# Patient Record
Sex: MALE | Race: WHITE | NOT HISPANIC OR LATINO | Employment: OTHER | URBAN - NONMETROPOLITAN AREA
[De-identification: names, ages, dates, MRNs, and addresses within clinical notes are randomized per-mention and may not be internally consistent; named-entity substitution may affect disease eponyms.]

---

## 2022-12-08 ENCOUNTER — OFFICE VISIT (OUTPATIENT)
Dept: CARDIOLOGY | Facility: CLINIC | Age: 72
End: 2022-12-08

## 2022-12-08 VITALS
SYSTOLIC BLOOD PRESSURE: 110 MMHG | BODY MASS INDEX: 25.18 KG/M2 | HEIGHT: 67 IN | WEIGHT: 160.4 LBS | DIASTOLIC BLOOD PRESSURE: 64 MMHG | HEART RATE: 60 BPM

## 2022-12-08 DIAGNOSIS — Z95.1 HISTORY OF CORONARY ARTERY BYPASS GRAFT X 3: ICD-10-CM

## 2022-12-08 DIAGNOSIS — R53.83 OTHER FATIGUE: ICD-10-CM

## 2022-12-08 DIAGNOSIS — J43.9 PULMONARY EMPHYSEMA, UNSPECIFIED EMPHYSEMA TYPE: ICD-10-CM

## 2022-12-08 DIAGNOSIS — R94.31 ABNORMAL EKG: ICD-10-CM

## 2022-12-08 DIAGNOSIS — D50.9 IRON DEFICIENCY ANEMIA, UNSPECIFIED IRON DEFICIENCY ANEMIA TYPE: ICD-10-CM

## 2022-12-08 DIAGNOSIS — R06.02 SHORTNESS OF BREATH: ICD-10-CM

## 2022-12-08 DIAGNOSIS — I65.23 BILATERAL CAROTID ARTERY STENOSIS: ICD-10-CM

## 2022-12-08 DIAGNOSIS — I44.0 FIRST DEGREE AV BLOCK: ICD-10-CM

## 2022-12-08 DIAGNOSIS — E78.00 PURE HYPERCHOLESTEROLEMIA: ICD-10-CM

## 2022-12-08 DIAGNOSIS — R42 DIZZINESS: ICD-10-CM

## 2022-12-08 DIAGNOSIS — I10 PRIMARY HYPERTENSION: ICD-10-CM

## 2022-12-08 DIAGNOSIS — I45.10 RBBB: ICD-10-CM

## 2022-12-08 DIAGNOSIS — K21.9 GASTROESOPHAGEAL REFLUX DISEASE, UNSPECIFIED WHETHER ESOPHAGITIS PRESENT: ICD-10-CM

## 2022-12-08 DIAGNOSIS — I25.118 CORONARY ARTERY DISEASE INVOLVING NATIVE CORONARY ARTERY OF NATIVE HEART WITH OTHER FORM OF ANGINA PECTORIS: Primary | ICD-10-CM

## 2022-12-08 PROCEDURE — 93000 ELECTROCARDIOGRAM COMPLETE: CPT | Performed by: NURSE PRACTITIONER

## 2022-12-08 PROCEDURE — 99204 OFFICE O/P NEW MOD 45 MIN: CPT | Performed by: NURSE PRACTITIONER

## 2022-12-08 RX ORDER — ROSUVASTATIN CALCIUM 40 MG/1
40 TABLET, COATED ORAL DAILY
COMMUNITY

## 2022-12-08 RX ORDER — PANTOPRAZOLE SODIUM 40 MG/1
40 TABLET, DELAYED RELEASE ORAL DAILY
COMMUNITY

## 2022-12-08 RX ORDER — AMLODIPINE BESYLATE 5 MG/1
5 TABLET ORAL DAILY
COMMUNITY

## 2022-12-08 RX ORDER — ASPIRIN 81 MG/1
81 TABLET ORAL DAILY
COMMUNITY

## 2022-12-08 RX ORDER — FLUTICASONE PROPIONATE 50 MCG
2 SPRAY, SUSPENSION (ML) NASAL 2 TIMES DAILY
COMMUNITY

## 2022-12-08 RX ORDER — ALBUTEROL SULFATE 90 UG/1
2 AEROSOL, METERED RESPIRATORY (INHALATION) EVERY 4 HOURS PRN
COMMUNITY

## 2022-12-08 RX ORDER — HYDROCHLOROTHIAZIDE 12.5 MG/1
12.5 TABLET ORAL DAILY
COMMUNITY

## 2022-12-08 RX ORDER — FERROUS SULFATE 325(65) MG
325 TABLET ORAL
COMMUNITY

## 2022-12-08 RX ORDER — LISINOPRIL 40 MG/1
40 TABLET ORAL DAILY
COMMUNITY

## 2022-12-08 RX ORDER — ATENOLOL 25 MG/1
25 TABLET ORAL DAILY
COMMUNITY

## 2022-12-08 NOTE — PROGRESS NOTES
Subjective   John Urbina is a 72 y.o. male seen in the office today to establish cardiac care.  His past medical history includes: Hypertension, hyperlipidemia, CAD, CKD, and iron deficiency anemia.  He underwent three-vessel coronary artery bypass grafting per Dr. Fox in 2007.  Patient admits being diagnosed with mild stenosis and carotid arteries but unsure when last checked.  He has a history of smoking during  with cessation in 1990.  He admits to daily alcohol intake but no more than 2 beers.  Smoking hx with cessation in 1990.     In September, he was seen in the hospital due to dizziness and hypertension.  CT head scan was negative for active or acute intracranial processes.  CT of the chest without contrast showed emphysema.  Cardiac size was normal with mention of advanced atherosclerotic plaque formation throughout the coronary arteries.  He was diagnosed and treated for iron deficiency anemia.    Currently he admits to shortness of breath and fatigue but denies chest pain or palpitations.  TIA symptoms denied.  He admits to maintaining his normal daily activities which some days includes golfing.  No recent change in cardiac medications noted per patient.    Chief Complaint   Patient presents with   • Establish Care     [facility]. Patient has CABG in 2007 . He received  blood in July 2022 d/t anemia . Patient has no cardiac complaints today .Has not saw Cardiology since 2010 .   • LABS and TESTING     Had labs in July 2022 at [facility] . [facility] and will most likely have labs then.          Initial cardiac evaluation; to establish cardiac care    Cardiac History  No past surgical history on file.    Current Outpatient Medications   Medication Sig Dispense Refill   • albuterol sulfate  (90 Base) MCG/ACT inhaler Inhale 2 puffs Every 4 (Four) Hours As Needed for Wheezing.     • amLODIPine (NORVASC) 5 MG tablet Take 5 mg by mouth Daily.      • aspirin 81 MG EC tablet Take 81 mg by mouth Daily.     • atenolol (TENORMIN) 25 MG tablet Take 25 mg by mouth Daily.     • CHLORPHENIRAMINE MALEATE ER PO Take 1 tablet by mouth Daily With Breakfast & Dinner.     • ferrous sulfate 325 (65 FE) MG tablet Take 325 mg by mouth Daily With Breakfast.     • fluticasone (FLONASE) 50 MCG/ACT nasal spray 2 sprays into the nostril(s) as directed by provider 2 (Two) Times a Day.     • hydroCHLOROthiazide (HYDRODIURIL) 12.5 MG tablet Take 12.5 mg by mouth Daily.     • lisinopril (PRINIVIL,ZESTRIL) 40 MG tablet Take 40 mg by mouth Daily.     • pantoprazole (PROTONIX) 40 MG EC tablet Take 40 mg by mouth Daily.     • rosuvastatin (CRESTOR) 40 MG tablet Take 40 mg by mouth Daily.       No current facility-administered medications for this visit.       Patient has no known allergies.    Past Medical History:   Diagnosis Date   • COPD (chronic obstructive pulmonary disease) ([facility]ly [facility])    • Hyperlipidemia    • Kidney stones    • Myocardial infarction (Formerly [facility])        Social History     Socioeconomic History   • Marital status:    Tobacco Use   • Smoking status: Former     Packs/day: 1.00     Years: 30.00     Pack years: 30.00     Types: Cigarettes     Quit date:      Years since quittin.9   • Smokeless tobacco: Former     Types: Chew   Vaping Use   • Vaping Use: Never used   Substance and Sexual Activity   • Alcohol use: Yes     Alcohol/week: 2.0 standard drinks     Types: 2 Cans of beer per week     Comment: weekly   • Drug use: Never       Family History   Problem Relation Age of Onset   • Lung cancer Mother    • Emphysema Mother    • Emphysema Father    • Diabetes Sister    • No Known Problems Brother        Review of Systems   Constitutional: Positive for fatigue. Negative for activity change, appetite change and fever.   HENT: Positive for ear pain (At times), hearing loss and tinnitus. Negative for congestion and sinus pressure.    Eyes: Negative for pain and redness.  "  Respiratory: Positive for shortness of breath. Negative for apnea, cough and chest tightness.    Cardiovascular: Negative for chest pain, palpitations and leg swelling.   Gastrointestinal: Negative for abdominal pain, anal bleeding, blood in stool, constipation, diarrhea and nausea.   Endocrine: Negative.    Genitourinary: Negative for difficulty urinating and hematuria.   Musculoskeletal: Positive for arthralgias. Negative for gait problem.   Skin: Negative.    Neurological: Positive for dizziness (Especially \"when blood was low\"). Negative for seizures, syncope, speech difficulty, weakness, numbness and headaches.   Hematological: Negative for adenopathy. Bruises/bleeds easily.   Psychiatric/Behavioral: Negative for sleep disturbance.        Patient admits to feeling more depression since the loss of his daughter earlier this year.  Since that time he admits to marital issues.       BP Readings from Last 5 Encounters:   12/08/22 110/64       Wt Readings from Last 5 Encounters:   12/08/22 72.8 kg (160 lb 6.4 oz)          Objective      Labs 9/14/2022: WBC 5.6, RBC 3.35, hemoglobin 8.2, hematocrit 26.7, platelets 213, PT 10.5, INR 0.95, PTT 24.2, stool occult blood negative, troponin 13, sodium 139, potassium 4.4, chloride 107, CO2 24, glucose 89, BUN 27, creatinine 2.1, GFR 33, calcium 9.6,    /64 (BP Location: Left arm, Patient Position: Sitting)   Pulse 60   Ht 170.2 cm (67\")   Wt 72.8 kg (160 lb 6.4 oz)   BMI 25.12 kg/m²     Physical Exam  Vitals and nursing note reviewed.   Constitutional:       Appearance: Normal appearance.   HENT:      Head: Normocephalic.      Nose: Nose normal.   Eyes:      Conjunctiva/sclera: Conjunctivae normal.   Neck:      Vascular: No carotid bruit or JVD.   Cardiovascular:      Rate and Rhythm: Normal rate and regular rhythm.      Pulses:           Radial pulses are 2+ on the right side and 2+ on the left side.   Pulmonary:      Effort: Pulmonary effort is normal.      " Breath sounds: Decreased air movement present. No wheezing or rales.   Abdominal:      General: Abdomen is flat. Bowel sounds are normal. There is no distension.      Tenderness: There is no abdominal tenderness.   Musculoskeletal:      Cervical back: Neck supple.      Right lower leg: No edema.      Left lower leg: No edema.   Skin:     General: Skin is warm and dry.      Coloration: Skin is not jaundiced or pale.      Findings: No rash.      Nails: There is no clubbing.   Neurological:      Mental Status: He is alert and oriented to person, place, and time.      Motor: No weakness.      Gait: Gait normal.   Psychiatric:         Attention and Perception: Attention normal.         Mood and Affect: Mood normal.         Speech: Speech normal.         Behavior: Behavior normal.           ECG 12 Lead    Date/Time: 12/8/2022 10:52 AM  Performed by: Cheryl Vasquez APRN  Authorized by: Cheryl Vasquez APRN   Previous ECG: no previous ECG available  Rhythm: sinus rhythm  Rate: normal  BPM: 60  Conduction: right bundle branch block and 1st degree AV block  Q waves: V1    QRS axis: left            Assessment & Plan     Diagnoses and all orders for this visit:    1. Coronary artery disease involving native coronary artery of native heart with other form of angina pectoris (HCC) (Primary)  -     Stress Test With Myocardial Perfusion One Day; Future  -     Adult Transthoracic Echo Complete W/ Cont if Necessary Per Protocol; Future  -     ECG 12 Lead    2. History of coronary artery bypass graft x 3    3. Abnormal EKG  -     Stress Test With Myocardial Perfusion One Day; Future  -     Adult Transthoracic Echo Complete W/ Cont if Necessary Per Protocol; Future    4. RBBB  -     Stress Test With Myocardial Perfusion One Day; Future  -     Adult Transthoracic Echo Complete W/ Cont if Necessary Per Protocol; Future  -     ECG 12 Lead    5. First degree AV block  -     Stress Test With Myocardial Perfusion One Day; Future  -      Adult Transthoracic Echo Complete W/ Cont if Necessary Per Protocol; Future  -     ECG 12 Lead    6. Shortness of breath  -     Stress Test With Myocardial Perfusion One Day; Future  -     Adult Transthoracic Echo Complete W/ Cont if Necessary Per Protocol; Future    7. Pulmonary emphysema, unspecified emphysema type (HCC)    8. Primary hypertension    9. Pure hypercholesterolemia  -     Stress Test With Myocardial Perfusion One Day; Future  -     Adult Transthoracic Echo Complete W/ Cont if Necessary Per Protocol; Future    10. Other fatigue  -     Stress Test With Myocardial Perfusion One Day; Future  -     Adult Transthoracic Echo Complete W/ Cont if Necessary Per Protocol; Future    11. Dizziness  -     Stress Test With Myocardial Perfusion One Day; Future  -     Adult Transthoracic Echo Complete W/ Cont if Necessary Per Protocol; Future  -     US Carotid Bilateral; Future    12. Bilateral carotid artery stenosis  -     US Carotid Bilateral; Future    13. Iron deficiency anemia, unspecified iron deficiency anemia type    14. Gastroesophageal reflux disease, unspecified whether esophagitis present      Patient admits to shortness of breath and fatigue.  He had dizziness which was worse when had significant anemia treated during hospital stay in September of this year.  He has significant cardiac history and risk.  Patient admits last cardiac work-up was probably over 5 years ago.  I do not have records from previous bypass surgery or follow-up testing.  To reestablish baseline cardiac status as well as look for ischemic cause of his current symptoms a nuclear Lexiscan stress test ordered.  To look at overall cardiac function, valvular structure, PA pressure and echocardiogram ordered.  Due to dizziness and patient admitting to previous carotid artery disease of unknown significance we will also repeat carotid ultrasound.    Currently has blood pressure is normal.  Heart rate and rhythm are normal.  He does have  first-degree AV block and right bundle branch block.  I did not change dose of beta-blocker or calcium channel blocker.  At this time he will continue current cardiac medication regimen with further recommendations based on test results.    I complemented him for maintaining smoking cessation for many years now.  He admits to limited alcohol intake, 2 cans of beer daily.    BMI is normal.  Heart healthy diet encouraged.    [facility]  He will further discuss repeat labs, therefore, I did not provide lab order today.  He also plans to discuss allergy symptoms issues with sinus drainage, scheduling for hearing aid appointment, and possible benefit of counseling since loss of his daughter/marriage issues.     A 6-month follow-up visit scheduled.

## 2023-01-05 ENCOUNTER — HOSPITAL ENCOUNTER (OUTPATIENT)
Dept: CARDIOLOGY | Facility: HOSPITAL | Age: 73
Discharge: HOME | End: 2023-01-05
Payer: MEDICARE

## 2023-01-05 DIAGNOSIS — E78.00 PURE HYPERCHOLESTEROLEMIA: ICD-10-CM

## 2023-01-05 DIAGNOSIS — R42 DIZZINESS: ICD-10-CM

## 2023-01-05 DIAGNOSIS — I45.10 RBBB: ICD-10-CM

## 2023-01-05 DIAGNOSIS — R53.83 OTHER FATIGUE: ICD-10-CM

## 2023-01-05 DIAGNOSIS — I65.23 BILATERAL CAROTID ARTERY STENOSIS: ICD-10-CM

## 2023-01-05 DIAGNOSIS — I44.0 FIRST DEGREE AV BLOCK: ICD-10-CM

## 2023-01-05 DIAGNOSIS — R42 DIZZINESS: Primary | ICD-10-CM

## 2023-01-05 DIAGNOSIS — R94.31 ABNORMAL EKG: ICD-10-CM

## 2023-01-05 DIAGNOSIS — I25.118 CORONARY ARTERY DISEASE INVOLVING NATIVE CORONARY ARTERY OF NATIVE HEART WITH OTHER FORM OF ANGINA PECTORIS: ICD-10-CM

## 2023-01-05 DIAGNOSIS — R06.02 SHORTNESS OF BREATH: ICD-10-CM

## 2023-01-05 LAB
BH CV ECHO MEAS - ACS: 1.75 CM
BH CV ECHO MEAS - AO MAX PG: 3 MMHG
BH CV ECHO MEAS - AO MEAN PG: 1.84 MMHG
BH CV ECHO MEAS - AO ROOT DIAM: 4.3 CM
BH CV ECHO MEAS - AO V2 MAX: 86.9 CM/SEC
BH CV ECHO MEAS - AO V2 VTI: 22.2 CM
BH CV ECHO MEAS - EDV(CUBED): 108.5 ML
BH CV ECHO MEAS - EDV(MOD-SP4): 97.5 ML
BH CV ECHO MEAS - EF(MOD-SP4): 52.2 %
BH CV ECHO MEAS - EF_3D-VOL: 57 %
BH CV ECHO MEAS - ESV(CUBED): 17.2 ML
BH CV ECHO MEAS - ESV(MOD-SP4): 46.6 ML
BH CV ECHO MEAS - FS: 45.9 %
BH CV ECHO MEAS - IVS/LVPW: 0.91 CM
BH CV ECHO MEAS - IVSD: 0.92 CM
BH CV ECHO MEAS - LA DIMENSION: 4.4 CM
BH CV ECHO MEAS - LAT PEAK E' VEL: 11.2 CM/SEC
BH CV ECHO MEAS - LV DIASTOLIC VOL/BSA (35-75): 53 CM2
BH CV ECHO MEAS - LV MASS(C)D: 160.8 GRAMS
BH CV ECHO MEAS - LV SYSTOLIC VOL/BSA (12-30): 25.3 CM2
BH CV ECHO MEAS - LVIDD: 4.8 CM
BH CV ECHO MEAS - LVIDS: 2.6 CM
BH CV ECHO MEAS - LVOT AREA: 3 CM2
BH CV ECHO MEAS - LVOT DIAM: 1.97 CM
BH CV ECHO MEAS - LVPWD: 1.01 CM
BH CV ECHO MEAS - MED PEAK E' VEL: 8.3 CM/SEC
BH CV ECHO MEAS - MV A MAX VEL: 86.5 CM/SEC
BH CV ECHO MEAS - MV DEC SLOPE: 197.1 CM/SEC2
BH CV ECHO MEAS - MV E MAX VEL: 54 CM/SEC
BH CV ECHO MEAS - MV E/A: 0.62
BH CV ECHO MEAS - RVDD: 2.7 CM
BH CV ECHO MEAS - SI(MOD-SP4): 27.7 ML/M2
BH CV ECHO MEAS - SV(MOD-SP4): 50.9 ML
BH CV ECHO MEASUREMENTS AVERAGE E/E' RATIO: 5.54
BH CV XLRA MEAS LEFT DIST CCA EDV: 13.2 CM/SEC
BH CV XLRA MEAS LEFT DIST CCA PSV: 68.4 CM/SEC
BH CV XLRA MEAS LEFT DIST ICA EDV: -30.9 CM/SEC
BH CV XLRA MEAS LEFT DIST ICA PSV: -96.5 CM/SEC
BH CV XLRA MEAS LEFT ICA/CCA RATIO: -3.6
BH CV XLRA MEAS LEFT MID ICA EDV: 40.1 CM/SEC
BH CV XLRA MEAS LEFT MID ICA PSV: 135.9 CM/SEC
BH CV XLRA MEAS LEFT PROX CCA EDV: 13.2 CM/SEC
BH CV XLRA MEAS LEFT PROX CCA PSV: 72.2 CM/SEC
BH CV XLRA MEAS LEFT PROX ECA EDV: -8.8 CM/SEC
BH CV XLRA MEAS LEFT PROX ECA PSV: -124.5 CM/SEC
BH CV XLRA MEAS LEFT PROX ICA EDV: -80.8 CM/SEC
BH CV XLRA MEAS LEFT PROX ICA PSV: -243.6 CM/SEC
BH CV XLRA MEAS LEFT VERTEBRAL A EDV: -7.5 CM/SEC
BH CV XLRA MEAS LEFT VERTEBRAL A PSV: -20.1 CM/SEC
BH CV XLRA MEAS RIGHT DIST CCA EDV: 18.2 CM/SEC
BH CV XLRA MEAS RIGHT DIST CCA PSV: 85.5 CM/SEC
BH CV XLRA MEAS RIGHT DIST ICA EDV: -32.1 CM/SEC
BH CV XLRA MEAS RIGHT DIST ICA PSV: -120.1 CM/SEC
BH CV XLRA MEAS RIGHT ICA/CCA RATIO: -1.4
BH CV XLRA MEAS RIGHT MID ICA EDV: -28 CM/SEC
BH CV XLRA MEAS RIGHT MID ICA PSV: -101.7 CM/SEC
BH CV XLRA MEAS RIGHT PROX CCA EDV: 18.9 CM/SEC
BH CV XLRA MEAS RIGHT PROX CCA PSV: 78.6 CM/SEC
BH CV XLRA MEAS RIGHT PROX ECA EDV: -14.5 CM/SEC
BH CV XLRA MEAS RIGHT PROX ECA PSV: -138.9 CM/SEC
BH CV XLRA MEAS RIGHT PROX ICA EDV: -25.1 CM/SEC
BH CV XLRA MEAS RIGHT PROX ICA PSV: -103.1 CM/SEC
BH CV XLRA MEAS RIGHT VERTEBRAL A EDV: 12.6 CM/SEC
BH CV XLRA MEAS RIGHT VERTEBRAL A PSV: 51 CM/SEC
LEFT ATRIUM VOLUME INDEX: 28.7 ML/M2
MAXIMAL PREDICTED HEART RATE: 147 BPM
STRESS TARGET HR: 125 BPM

## 2023-01-05 PROCEDURE — 93306 TTE W/DOPPLER COMPLETE: CPT

## 2023-01-05 PROCEDURE — 93880 EXTRACRANIAL BILAT STUDY: CPT | Performed by: RADIOLOGY

## 2023-01-05 PROCEDURE — 93880 EXTRACRANIAL BILAT STUDY: CPT

## 2023-01-05 PROCEDURE — 93306 TTE W/DOPPLER COMPLETE: CPT | Performed by: INTERNAL MEDICINE

## 2023-01-05 NOTE — PROGRESS NOTES
Pt made aware of US carotid results.  Discussed provider recommendations.  Verbalizes understanding. He is willing to have CTA done.  Scheduled @ [facility] 01/25/23 @ 3:30PM. Pt notified of appt details.  [facility]  He will have this done between now and 1 week prior to test.  CTA orders faxed to hospital. CTA orders and lab request mailed to patient.

## 2023-01-06 ENCOUNTER — TELEPHONE (OUTPATIENT)
Dept: CARDIOLOGY | Facility: CLINIC | Age: 73
End: 2023-01-06
Payer: MEDICARE

## 2023-01-06 NOTE — TELEPHONE ENCOUNTER
----- Message from Danielle Rose LPN sent at 1/6/2023 11:40 AM EST -----    ----- Message -----  From: Cheryl Vasquez APRN  Sent: 1/6/2023   9:35 AM EST  To: Stanley Joseph MA    (Patient had carotid ultrasound with abnormal results, CTA advised) stress test ordered but no results noted. Please inform patient echo results. EF stable. Has mild heart murmur. Has mild dilation of aortic root and left atrial cavity. Good BP control encouraged.      (3) assistive equipment and person

## 2023-01-17 LAB
BUN SERPL-MCNC: 36 MG/DL (ref 8–27)
BUN/CREAT SERPL: 19 (ref 10–24)
CALCIUM SERPL-MCNC: 9.3 MG/DL (ref 8.6–10.2)
CHLORIDE SERPL-SCNC: 105 MMOL/L (ref 96–106)
CO2 SERPL-SCNC: 23 MMOL/L (ref 20–29)
CREAT SERPL-MCNC: 1.91 MG/DL (ref 0.76–1.27)
EGFRCR SERPLBLD CKD-EPI 2021: 37 ML/MIN/1.73
GLUCOSE SERPL-MCNC: 100 MG/DL (ref 70–99)
POTASSIUM SERPL-SCNC: 4.8 MMOL/L (ref 3.5–5.2)
SODIUM SERPL-SCNC: 143 MMOL/L (ref 134–144)

## 2023-01-20 DIAGNOSIS — R42 DIZZINESS: Primary | ICD-10-CM

## 2023-01-20 DIAGNOSIS — I65.23 BILATERAL CAROTID ARTERY STENOSIS: ICD-10-CM

## 2023-06-14 NOTE — PROGRESS NOTES
Chief Complaint   Patient presents with    Follow-up     Pt is here for cardiac follow up.  Pt denies CP, SOB, dizziness or palpitations.  Pt does take a daily ASA.    Med Refill     [facility].  Medications were verified by med list.    Lab Work     Pt states their last [facility] the [facility].         Cardiac Complaints  none      Subjective   John Urbina is a 73 y.o. male with HTN, hyperlipidemia, CKD [facility], IHD, and anemia. In 2007, he underwent CABG with Dr. Fox. In September 2022, he was in the hospital for dizziness and HTN. T head scan was negative for active or acute intracranial processes.  CT of the chest without contrast showed emphysema.  Cardiac size was normal with mention of advanced atherosclerotic plaque formation throughout the coronary arteries.  He was diagnosed and treated for iron deficiency anemia. Cardiac workup recommended at consult with stress, echo, and carotid US. He did not complete stress testing, but did complete echo and carotid US. Carotid US was abnormal, 70-90% LICA stenosis noted, unable to do CTA due to renal function, MRA of neck advised, unable to get pictures from motion artifact. [facility] made aware. Echo showed EF 60%, trace MR.     He comes today for follow up and denies any new concerns. All labs and most testing done with [facility]. TIA symptoms are denied. No CP, SOA, palpitations, dizziness, and syncope are denied. [facility] were [facility] yesterday with [facility]. No meds are needed, followed by [facility]. Remains [facility].        Cardiac History  Past Surgical History:   Procedure Laterality Date    ECHO - CONVERTED  01/05/2023    TLS. EF 60%. LA- 4.4. AO- 4.3. Trace-Mild MR    US CAROTID UNILATERAL Bilateral 01/05/2023    LICA: 70-94%       Current Outpatient Medications   Medication Sig Dispense Refill    albuterol sulfate  (90 Base) MCG/ACT inhaler Inhale 2 puffs Every 4 (Four) Hours As Needed for Wheezing.      amLODIPine (NORVASC) 5 MG tablet  Take 1 tablet by mouth Daily.      aspirin 81 MG EC tablet Take 1 tablet by mouth Daily.      atenolol (TENORMIN) 25 MG tablet Take 1 tablet by mouth Daily.      CHLORPHENIRAMINE MALEATE ER PO Take 1 tablet by mouth Daily With Breakfast & Dinner.      ferrous sulfate 325 (65 FE) MG tablet Take 1 tablet by mouth Daily With Breakfast.      folic acid (FOLVITE) 1 MG tablet Take 1 tablet by mouth Daily.      hydroCHLOROthiazide (HYDRODIURIL) 25 MG tablet Take 1 tablet by mouth Daily.      lisinopril (PRINIVIL,ZESTRIL) 40 MG tablet Take 1 tablet by mouth Daily.      loratadine (Claritin) 10 MG tablet Take 1 tablet by mouth Daily.      pantoprazole (PROTONIX) 40 MG EC tablet Take 1 tablet by mouth Daily.      rosuvastatin (CRESTOR) 20 MG tablet Take 1 tablet by mouth Daily.      vitamin B-12 (CYANOCOBALAMIN) 500 MCG tablet Take 1 tablet by mouth Daily.       No current facility-administered medications for this visit.       Patient has no known allergies.    Past Medical History:   Diagnosis Date    COPD (chronic obstructive pulmonary disease)     Hyperlipidemia     Kidney stones     Myocardial infarction        Social History     Socioeconomic History    Marital status:    Tobacco Use    Smoking status: Former     Packs/day: 1.00     Years: 30.00     Pack years: 30.00     Types: Cigarettes     Quit date:      Years since quittin.4    Smokeless tobacco: Former     Types: Chew   Vaping Use    Vaping Use: Never used   Substance and Sexual Activity    Alcohol use: Not Currently    Drug use: Never       Family History   Problem Relation Age of Onset    Lung cancer Mother     Emphysema Mother     Emphysema Father     Diabetes Sister     No Known Problems Brother        Review of Systems   Constitutional: Negative for malaise/fatigue and night sweats.   Cardiovascular:  Negative for chest pain, claudication, dyspnea on exertion, irregular heartbeat, leg swelling, near-syncope, orthopnea, palpitations and  "syncope.   Respiratory:  Negative for cough, shortness of breath and wheezing.    Musculoskeletal:  Positive for stiffness. Negative for back pain and joint pain.   Gastrointestinal:  Negative for anorexia, heartburn, nausea and vomiting.   Genitourinary:  Negative for dysuria, hematuria, hesitancy and nocturia.   Neurological:  Negative for dizziness, light-headedness and loss of balance.   Psychiatric/Behavioral:  Negative for depression and memory loss. The patient is not nervous/anxious.          Objective     /64 (BP Location: Left arm, Patient Position: Sitting)   Pulse 66   Ht 170.2 cm (67\")   Wt 74.8 kg (165 lb)   BMI 25.84 kg/m²     Constitutional:       Appearance: Not in distress.   Eyes:      Pupils: Pupils are equal, round, and reactive to light.   HENT:      Nose: Nose normal.   Pulmonary:      Effort: Pulmonary effort is normal.      Breath sounds: Normal breath sounds.   Cardiovascular:      PMI at left midclavicular line. Normal rate. Regular rhythm.      Murmurs: There is a systolic murmur.   Abdominal:      Palpations: Abdomen is soft.   Musculoskeletal: Normal range of motion.      Cervical back: Normal range of motion and neck supple. Skin:     General: Skin is warm and dry.   Neurological:      Mental Status: Alert.       Procedures         Diagnoses and all orders for this visit:    1. Primary hypertension (Primary)    2. IHD (ischemic heart disease)    3. Left carotid stenosis    4. Shortness of breath    5. Pure hypercholesterolemia    6. Stage 3b chronic kidney disease    7. Overweight             LICA stenosis: noted as severe on carotid US, MRA was done given kidney function but was not a good test. He was urged he may need CTA, but renal clearance required. He does not think this was done. He was urged to [facility] with [facility], as [facility].  TIA symptoms are denied. ASA continued.    SOA: echo done showed valve areas stable, EF 60%, no sig pulmonary HTN. We discussed " stress testing, but he declines, states he does not want to have one again. Remains active at home most days.    HTN: BP is stable, SBP mildly elevated at 144, he admits at home it has been well controlled. Continue HCTZ, tenormin, and norvasc. Limited sodium urged.    Hyperlipidemia: On statin therapy with crestor. Patient is tolerating well. FLP followed by [facility]. No labs available. Continue same. Low carb diet urged.    [facility]ills with [facility]    BMI noted at 25.84, good cardiac diet with limited carb, calories, and walking as tolerated advised.    6 month follow up advised.        Problems Addressed this Visit    None  Visit Diagnoses       Primary hypertension    -  Primary    IHD (ischemic heart disease)        Left carotid stenosis        Shortness of breath        Pure hypercholesterolemia        Stage 3b chronic kidney disease        Overweight              Diagnoses         Codes Comments    Primary hypertension    -  Primary ICD-10-CM: I10  ICD-9-CM: 401.9     IHD (ischemic heart disease)     ICD-10-CM: I25.9  ICD-9-CM: 414.9     Left carotid stenosis     ICD-10-CM: I65.22  ICD-9-CM: 433.10     Shortness of breath     ICD-10-CM: R06.02  ICD-9-CM: 786.05     Pure hypercholesterolemia     ICD-10-CM: E78.00  ICD-9-CM: 272.0     Stage 3b chronic kidney disease     ICD-10-CM: N18.32  ICD-9-CM: 585.3     Overweight     ICD-10-CM: E66.3  ICD-9-CM: 278.02                     Electronically signed by SELIN Chahal Sarah 15, 2023 11:24 EDT

## 2023-06-15 ENCOUNTER — OFFICE VISIT (OUTPATIENT)
Dept: CARDIOLOGY | Facility: CLINIC | Age: 73
End: 2023-06-15
Payer: OTHER GOVERNMENT

## 2023-06-15 ENCOUNTER — TELEPHONE (OUTPATIENT)
Dept: CARDIOLOGY | Facility: CLINIC | Age: 73
End: 2023-06-15

## 2023-06-15 VITALS
HEIGHT: 67 IN | BODY MASS INDEX: 25.9 KG/M2 | WEIGHT: 165 LBS | HEART RATE: 66 BPM | DIASTOLIC BLOOD PRESSURE: 64 MMHG | SYSTOLIC BLOOD PRESSURE: 144 MMHG

## 2023-06-15 DIAGNOSIS — N18.32 STAGE 3B CHRONIC KIDNEY DISEASE: ICD-10-CM

## 2023-06-15 DIAGNOSIS — E78.00 PURE HYPERCHOLESTEROLEMIA: ICD-10-CM

## 2023-06-15 DIAGNOSIS — E66.3 OVERWEIGHT: ICD-10-CM

## 2023-06-15 DIAGNOSIS — I25.9 IHD (ISCHEMIC HEART DISEASE): ICD-10-CM

## 2023-06-15 DIAGNOSIS — I10 PRIMARY HYPERTENSION: Primary | ICD-10-CM

## 2023-06-15 DIAGNOSIS — R06.02 SHORTNESS OF BREATH: ICD-10-CM

## 2023-06-15 DIAGNOSIS — I65.22 LEFT CAROTID STENOSIS: ICD-10-CM

## 2023-06-15 RX ORDER — CHOLECALCIFEROL (VITAMIN D3) 125 MCG
500 CAPSULE ORAL DAILY
COMMUNITY

## 2023-06-15 RX ORDER — LORATADINE 10 MG/1
10 TABLET ORAL DAILY
COMMUNITY

## 2023-06-15 RX ORDER — FOLIC ACID 1 MG/1
1 TABLET ORAL DAILY
COMMUNITY

## 2023-06-15 NOTE — TELEPHONE ENCOUNTER
[facility] to [facility] requesting any copy of possible testing of CTA of neck and also most recent labs.

## 2024-01-03 ENCOUNTER — OFFICE VISIT (OUTPATIENT)
Dept: CARDIOLOGY | Facility: CLINIC | Age: 74
End: 2024-01-03
Payer: MEDICARE

## 2024-01-03 VITALS
HEIGHT: 67 IN | DIASTOLIC BLOOD PRESSURE: 70 MMHG | BODY MASS INDEX: 24.8 KG/M2 | WEIGHT: 158 LBS | SYSTOLIC BLOOD PRESSURE: 130 MMHG | HEART RATE: 60 BPM

## 2024-01-03 DIAGNOSIS — I65.22 LEFT CAROTID STENOSIS: ICD-10-CM

## 2024-01-03 DIAGNOSIS — N18.4 CKD (CHRONIC KIDNEY DISEASE) STAGE 4, GFR 15-29 ML/MIN: ICD-10-CM

## 2024-01-03 DIAGNOSIS — I10 PRIMARY HYPERTENSION: Primary | ICD-10-CM

## 2024-01-03 DIAGNOSIS — E78.00 PURE HYPERCHOLESTEROLEMIA: ICD-10-CM

## 2024-01-03 NOTE — PROGRESS NOTES
Chief Complaint   Patient presents with    Follow-up     Cardiac management    Lab     Last labs in chart. He reports having CT of chest per Dr MUNIR Pathak, he has follow up today.    Med Refill     Does not need refills, [facility].        HPI:  ANNAMARIE Urbina is a 73 y.o. male seen in the office today to establish cardiac care.  His past medical history includes: Hypertension, hyperlipidemia, CAD, CKD, and iron deficiency anemia.  He underwent three-vessel coronary artery bypass grafting per Dr. Fox in 2007.  Patient admits being diagnosed with mild stenosis and carotid arteries but unsure when last checked.  He has a history of smoking during  with cessation in 1990.  He admits to daily alcohol intake but no more than 2 beers.     In September 2022, he was seen in the hospital due to dizziness and hypertension.  CT head scan was negative for active or acute intracranial processes.  CT of the chest without contrast showed emphysema.  Cardiac size was normal with mention of advanced atherosclerotic plaque formation throughout the coronary arteries.  He was diagnosed and treated for iron deficiency anemia.    He returns today for follow-up visit. Patient denies chest pain, pressure, palpitations, tightness, dizziness, shortness of air. He is following with Dr Pathak pulmonology and [facility] for [facility] G[facility] and elevated creatinine. [facility]. Carotid ultrasound 1 year ago revealed 70 to 94% left carotid stenosis.  CTA recommended however renal function in question.  Last labs showed GFR 27, creatinine 2.45.  , trig 95, HDL 35, LDL 98      Cardiac History:    Past Surgical History:   Procedure Laterality Date    ECHO - CONVERTED  01/05/2023    TLS. EF 60%. LA- 4.4. AO- 4.3. Trace-Mild MR    US CAROTID UNILATERAL Bilateral 01/05/2023    LICA: 70-94%       Current Outpatient Medications   Medication Sig Dispense Refill    amLODIPine (NORVASC) 5 MG  "tablet Take 1 tablet by mouth Daily.      aspirin 81 MG EC tablet Take 1 tablet by mouth Daily.      atenolol (TENORMIN) 25 MG tablet Take 1 tablet by mouth Daily.      CHLORPHENIRAMINE MALEATE ER PO Take 1 tablet by mouth Daily With Breakfast & Dinner.      ferrous sulfate 325 (65 FE) MG tablet Take 1 tablet by mouth Daily With Breakfast.      folic acid (FOLVITE) 1 MG tablet Take 1 tablet by mouth Daily.      hydroCHLOROthiazide (HYDRODIURIL) 25 MG tablet Take 1 tablet by mouth Daily.      lisinopril (PRINIVIL,ZESTRIL) 40 MG tablet Take 1 tablet by mouth Daily.      loratadine (Claritin) 10 MG tablet Take 1 tablet by mouth Daily.      pantoprazole (PROTONIX) 40 MG EC tablet Take 1 tablet by mouth Daily.      rosuvastatin (CRESTOR) 20 MG tablet Take 1 tablet by mouth Daily.      vitamin B-12 (CYANOCOBALAMIN) 500 MCG tablet Take 1 tablet by mouth Daily.       No current facility-administered medications for this visit.       Patient has no known allergies.    Past Medical History:   Diagnosis Date    COPD (chronic obstructive pulmonary disease)     Hyperlipidemia     Kidney stones     Myocardial infarction        Social History     Socioeconomic History    Marital status:    Tobacco Use    Smoking status: Former     Packs/day: 1.00     Years: 30.00     Additional pack years: 0.00     Total pack years: 30.00     Types: Cigarettes     Quit date:      Years since quittin.0     Passive exposure: Past    Smokeless tobacco: Former     Types: Chew   Vaping Use    Vaping Use: Never used   Substance and Sexual Activity    Alcohol use: Not Currently    Drug use: Never    Sexual activity: Defer       Family History   Problem Relation Age of Onset    Lung cancer Mother     Emphysema Mother     Emphysema Father     Diabetes Sister     No Known Problems Brother        Vitals:   /70 (BP Location: Left arm)   Pulse 60   Ht 170.2 cm (67.01\")   Wt 71.7 kg (158 lb)   BMI 24.74 kg/m²     Physical Exam  Vitals " and nursing note reviewed.   Neck:      Vascular: No carotid bruit.   Cardiovascular:      Rate and Rhythm: Normal rate and regular rhythm.      Pulses: Normal pulses.      Heart sounds: Normal heart sounds. No murmur heard.     No friction rub. No gallop.   Pulmonary:      Effort: Pulmonary effort is normal.      Breath sounds: Normal breath sounds and air entry.   Musculoskeletal:      Right lower leg: No edema.      Left lower leg: No edema.   Skin:     General: Skin is warm and dry.      Capillary Refill: Capillary refill takes less than 2 seconds.   Neurological:      Mental Status: He is alert and oriented to person, place, and time.       Procedures     Assessment & Plan     Diagnoses and all orders for this visit:    1. Primary hypertension (Primary)    2. Pure hypercholesterolemia    3. Left carotid stenosis  -     Cancel: US Carotid Bilateral; Future  -     Cancel: CT Angiogram Carotids; Future  -     MRI Angiogram Neck Without Contrast; Future    4. CKD (chronic kidney disease) stage 4, GFR 15-29 ml/min  -     Comprehensive Metabolic Panel; Future    HTN  - Blood pressure well-controlled  - Continue current medication regimen amlodipine, hydrochlorothiazide, lisinopril    Hypercholesteremia  - Lipids well-controlled  - Continue rosuvastatin    Left carotid stenosis  - Carotid ultrasound 1/2023 revealed 70 to 94% left carotid stenosis  - MRA 1 year ago was inconclusive due to motion artifact  - Repeat MRA ordered   - Consider referral to vascular     CKD  - Ordered CMP to recheck kidney function.  [facility]      Visit Diagnoses:    ICD-10-CM ICD-9-CM   1. Primary hypertension  I10 401.9   2. Pure hypercholesterolemia  E78.00 272.0   3. Left carotid stenosis  I65.22 433.10   4. CKD (chronic kidney disease) stage 4, GFR 15-29 ml/min  N18.4 585.4       Meds Ordered During Visit:  No orders of the defined types were placed in this encounter.      Follow Up Appointment:   Return in about  6 months (around 7/3/2024), or if symptoms worsen or fail to improve.           This document has been electronically signed by SELIN Zamora  January 3, 2024 09:13 EST    Dictated Utilizing Dragon Dictation: Part of this note may be an electronic transcription/translation of spoken language to printed text using the Dragon Dictation System.

## 2024-09-16 ENCOUNTER — TELEPHONE (OUTPATIENT)
Dept: CARDIOLOGY | Facility: CLINIC | Age: 74
End: 2024-09-16
Payer: MEDICARE

## 2024-09-19 ENCOUNTER — OFFICE VISIT (OUTPATIENT)
Dept: CARDIOLOGY | Facility: CLINIC | Age: 74
End: 2024-09-19
Payer: MEDICARE

## 2024-09-19 VITALS
WEIGHT: 154.4 LBS | DIASTOLIC BLOOD PRESSURE: 70 MMHG | HEART RATE: 60 BPM | HEIGHT: 67 IN | SYSTOLIC BLOOD PRESSURE: 110 MMHG | BODY MASS INDEX: 24.23 KG/M2

## 2024-09-19 DIAGNOSIS — I65.22 LEFT CAROTID STENOSIS: ICD-10-CM

## 2024-09-19 DIAGNOSIS — I10 PRIMARY HYPERTENSION: ICD-10-CM

## 2024-09-19 DIAGNOSIS — R07.9 CHEST PAIN, UNSPECIFIED TYPE: Primary | ICD-10-CM

## 2024-09-19 DIAGNOSIS — E78.00 PURE HYPERCHOLESTEROLEMIA: ICD-10-CM

## 2024-09-19 DIAGNOSIS — N18.4 CKD (CHRONIC KIDNEY DISEASE) STAGE 4, GFR 15-29 ML/MIN: ICD-10-CM

## 2024-09-19 RX ORDER — MULTIVIT WITH MINERALS/LUTEIN
250 TABLET ORAL DAILY
COMMUNITY

## 2024-10-24 ENCOUNTER — HOSPITAL ENCOUNTER (OUTPATIENT)
Dept: CARDIOLOGY | Facility: HOSPITAL | Age: 74
Discharge: HOME | End: 2024-10-24
Payer: OTHER GOVERNMENT

## 2024-10-24 DIAGNOSIS — R07.9 CHEST PAIN, UNSPECIFIED TYPE: ICD-10-CM

## 2024-10-24 LAB
BH CV ECHO MEAS - ACS: 1.8 CM
BH CV ECHO MEAS - AO MAX PG: 6 MMHG
BH CV ECHO MEAS - AO MEAN PG: 3 MMHG
BH CV ECHO MEAS - AO ROOT DIAM: 4.4 CM
BH CV ECHO MEAS - AO V2 MAX: 122 CM/SEC
BH CV ECHO MEAS - AO V2 VTI: 29.9 CM
BH CV ECHO MEAS - EDV(CUBED): 108.5 ML
BH CV ECHO MEAS - EDV(MOD-SP4): 118 ML
BH CV ECHO MEAS - EF(MOD-SP4): 36.9 %
BH CV ECHO MEAS - ESV(CUBED): 79.5 ML
BH CV ECHO MEAS - ESV(MOD-SP4): 74.4 ML
BH CV ECHO MEAS - FS: 9.9 %
BH CV ECHO MEAS - IVS/LVPW: 0.75 CM
BH CV ECHO MEAS - IVSD: 0.75 CM
BH CV ECHO MEAS - LA DIMENSION: 4.2 CM
BH CV ECHO MEAS - LAT PEAK E' VEL: 12 CM/SEC
BH CV ECHO MEAS - LV DIASTOLIC VOL/BSA (35-75): 65.2 CM2
BH CV ECHO MEAS - LV MASS(C)D: 142.3 GRAMS
BH CV ECHO MEAS - LV SYSTOLIC VOL/BSA (12-30): 41.1 CM2
BH CV ECHO MEAS - LVIDD: 4.8 CM
BH CV ECHO MEAS - LVIDS: 4.3 CM
BH CV ECHO MEAS - LVPWD: 1.01 CM
BH CV ECHO MEAS - MED PEAK E' VEL: 7.3 CM/SEC
BH CV ECHO MEAS - MV A MAX VEL: 87.8 CM/SEC
BH CV ECHO MEAS - MV DEC SLOPE: 252 CM/SEC2
BH CV ECHO MEAS - MV DEC TIME: 0.25 SEC
BH CV ECHO MEAS - MV E MAX VEL: 63.4 CM/SEC
BH CV ECHO MEAS - MV E/A: 0.72
BH CV ECHO MEAS - RAP SYSTOLE: 10 MMHG
BH CV ECHO MEAS - RVDD: 3.4 CM
BH CV ECHO MEAS - RVSP: 47.2 MMHG
BH CV ECHO MEAS - SV(MOD-SP4): 43.6 ML
BH CV ECHO MEAS - SVI(MOD-SP4): 24.1 ML/M2
BH CV ECHO MEAS - TR MAX PG: 37.2 MMHG
BH CV ECHO MEAS - TR MAX VEL: 305 CM/SEC
BH CV ECHO MEASUREMENTS AVERAGE E/E' RATIO: 6.57
BH CV REST NUCLEAR ISOTOPE DOSE: 10 MCI
BH CV STRESS COMMENTS STAGE 1: NORMAL
BH CV STRESS DOSE REGADENOSON STAGE 1: 0.4
BH CV STRESS DURATION MIN STAGE 1: 0
BH CV STRESS DURATION SEC STAGE 1: 10
BH CV STRESS NUCLEAR ISOTOPE DOSE: 30 MCI
BH CV STRESS PROTOCOL 1: NORMAL
BH CV STRESS RECOVERY BP: NORMAL MMHG
BH CV STRESS RECOVERY HR: 97 BPM
BH CV STRESS STAGE 1: 1
LV EF NUC BP: 54 %
MAXIMAL PREDICTED HEART RATE: 145 BPM
PERCENT MAX PREDICTED HR: 56.55 %
STRESS BASELINE BP: NORMAL MMHG
STRESS BASELINE HR: 55 BPM
STRESS PERCENT HR: 67 %
STRESS POST PEAK BP: NORMAL MMHG
STRESS POST PEAK HR: 82 BPM
STRESS TARGET HR: 123 BPM

## 2024-10-24 PROCEDURE — 93306 TTE W/DOPPLER COMPLETE: CPT

## 2024-10-24 PROCEDURE — A9500 TC99M SESTAMIBI: HCPCS | Performed by: INTERNAL MEDICINE

## 2024-10-24 PROCEDURE — 0 TECHNETIUM SESTAMIBI: Performed by: INTERNAL MEDICINE

## 2024-10-24 PROCEDURE — 78452 HT MUSCLE IMAGE SPECT MULT: CPT

## 2024-10-24 PROCEDURE — 25010000002 REGADENOSON 0.4 MG/5ML SOLUTION: Performed by: INTERNAL MEDICINE

## 2024-10-24 PROCEDURE — 93017 CV STRESS TEST TRACING ONLY: CPT

## 2024-10-24 RX ORDER — REGADENOSON 0.08 MG/ML
0.4 INJECTION, SOLUTION INTRAVENOUS
Status: COMPLETED | OUTPATIENT
Start: 2024-10-24 | End: 2024-10-24

## 2024-10-24 RX ADMIN — REGADENOSON 0.4 MG: 0.08 INJECTION, SOLUTION INTRAVENOUS at 09:44

## 2024-10-24 RX ADMIN — TECHNETIUM TC 99M SESTAMIBI 1 DOSE: 1 INJECTION INTRAVENOUS at 08:32

## 2024-10-24 RX ADMIN — TECHNETIUM TC 99M SESTAMIBI 1 DOSE: 1 INJECTION INTRAVENOUS at 09:44

## 2024-10-30 RX ORDER — ISOSORBIDE MONONITRATE 30 MG/1
30 TABLET, EXTENDED RELEASE ORAL DAILY
Qty: 90 TABLET | Refills: 3 | Status: SHIPPED | OUTPATIENT
Start: 2024-10-30

## 2025-04-02 ENCOUNTER — OFFICE VISIT (OUTPATIENT)
Dept: CARDIOLOGY | Facility: CLINIC | Age: 75
End: 2025-04-02
Payer: OTHER GOVERNMENT

## 2025-04-02 VITALS
BODY MASS INDEX: 23.95 KG/M2 | WEIGHT: 152.6 LBS | HEIGHT: 67 IN | HEART RATE: 64 BPM | DIASTOLIC BLOOD PRESSURE: 64 MMHG | SYSTOLIC BLOOD PRESSURE: 110 MMHG

## 2025-04-02 DIAGNOSIS — E78.00 PURE HYPERCHOLESTEROLEMIA: ICD-10-CM

## 2025-04-02 DIAGNOSIS — I10 PRIMARY HYPERTENSION: ICD-10-CM

## 2025-04-02 DIAGNOSIS — I25.9 IHD (ISCHEMIC HEART DISEASE): ICD-10-CM

## 2025-04-02 DIAGNOSIS — I65.22 LEFT CAROTID STENOSIS: Primary | ICD-10-CM

## 2025-04-02 PROCEDURE — 99214 OFFICE O/P EST MOD 30 MIN: CPT | Performed by: NURSE PRACTITIONER

## 2025-04-02 RX ORDER — ISOSORBIDE MONONITRATE 30 MG/1
30 TABLET, EXTENDED RELEASE ORAL DAILY
Qty: 90 TABLET | Refills: 3 | Status: SHIPPED | OUTPATIENT
Start: 2025-04-02

## 2025-04-02 RX ORDER — LISINOPRIL 40 MG/1
40 TABLET ORAL DAILY
COMMUNITY
End: 2025-04-02

## 2025-06-06 ENCOUNTER — TRANSCRIBE ORDERS (OUTPATIENT)
Dept: ADMINISTRATIVE | Facility: HOSPITAL | Age: 75
End: 2025-06-06
Payer: OTHER GOVERNMENT

## 2025-06-06 DIAGNOSIS — I65.22 LEFT CAROTID STENOSIS: Primary | ICD-10-CM
